# Patient Record
Sex: FEMALE | Race: WHITE
[De-identification: names, ages, dates, MRNs, and addresses within clinical notes are randomized per-mention and may not be internally consistent; named-entity substitution may affect disease eponyms.]

---

## 2019-11-06 ENCOUNTER — HOSPITAL ENCOUNTER (EMERGENCY)
Dept: HOSPITAL 35 - ER | Age: 64
Discharge: HOME | End: 2019-11-06
Payer: COMMERCIAL

## 2019-11-06 VITALS — BODY MASS INDEX: 28.17 KG/M2 | WEIGHT: 164.99 LBS | HEIGHT: 64 IN

## 2019-11-06 VITALS — SYSTOLIC BLOOD PRESSURE: 138 MMHG | DIASTOLIC BLOOD PRESSURE: 71 MMHG

## 2019-11-06 DIAGNOSIS — F17.210: ICD-10-CM

## 2019-11-06 DIAGNOSIS — M19.90: ICD-10-CM

## 2019-11-06 DIAGNOSIS — F31.9: ICD-10-CM
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
## 2019-11-06 DIAGNOSIS — Z91.013: ICD-10-CM
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
## 2019-11-06 DIAGNOSIS — S20.219A: ICD-10-CM

## 2019-11-06 DIAGNOSIS — Z86.73: ICD-10-CM

## 2019-11-06 DIAGNOSIS — S16.1XXA: Primary | ICD-10-CM

## 2019-11-06 DIAGNOSIS — Z88.6: ICD-10-CM
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
## 2019-11-06 LAB
ALBUMIN SERPL-MCNC: 3.5 G/DL (ref 3.4–5)
ALT SERPL-CCNC: 19 U/L (ref 30–65)
ANION GAP SERPL CALC-SCNC: 8 MMOL/L (ref 7–16)
AST SERPL-CCNC: 16 U/L (ref 15–37)
BASOPHILS NFR BLD AUTO: 0.7 % (ref 0–2)
BILIRUB SERPL-MCNC: 0.4 MG/DL
BUN SERPL-MCNC: 14 MG/DL (ref 7–18)
CALCIUM SERPL-MCNC: 9.6 MG/DL (ref 8.5–10.1)
CHLORIDE SERPL-SCNC: 102 MMOL/L (ref 98–107)
CO2 SERPL-SCNC: 29 MMOL/L (ref 21–32)
CREAT SERPL-MCNC: 1.2 MG/DL (ref 0.6–1)
EOSINOPHIL NFR BLD: 2.1 % (ref 0–3)
ERYTHROCYTE [DISTWIDTH] IN BLOOD BY AUTOMATED COUNT: 15.8 % (ref 10.5–14.5)
GLUCOSE SERPL-MCNC: 101 MG/DL (ref 74–106)
GRANULOCYTES NFR BLD MANUAL: 70.6 % (ref 36–66)
HCT VFR BLD CALC: 36.7 % (ref 37–47)
HGB BLD-MCNC: 12.2 GM/DL (ref 12–15)
LYMPHOCYTES NFR BLD AUTO: 20 % (ref 24–44)
MCH RBC QN AUTO: 29.4 PG (ref 26–34)
MCHC RBC AUTO-ENTMCNC: 33.1 G/DL (ref 28–37)
MCV RBC: 88.8 FL (ref 80–100)
MONOCYTES NFR BLD: 6.6 % (ref 1–8)
NEUTROPHILS # BLD: 8.3 THOU/UL (ref 1.4–8.2)
PLATELET # BLD: 336 THOU/UL (ref 150–400)
POTASSIUM SERPL-SCNC: 3.5 MMOL/L (ref 3.5–5.1)
PROT SERPL-MCNC: 7.2 G/DL (ref 6.4–8.2)
RBC # BLD AUTO: 4.13 MIL/UL (ref 4.2–5)
SODIUM SERPL-SCNC: 139 MMOL/L (ref 136–145)
TROPONIN I SERPL-MCNC: <0.06 NG/ML (ref ?–0.06)
WBC # BLD AUTO: 11.8 THOU/UL (ref 4–11)

## 2019-11-08 NOTE — EKG
[address]  [address]
Phone:  [phone]                    ELECTROCARDIOGRAM REPORT      
_______________________________________________________________________________
 
Name:       HARMAN LUCERO              [facility]                     [id number]     Account #:      [id number]  
Admission:  19    Attend Phys:                          
Discharge:  19    Date of Birth:  [birth year]  
                                                          Report #: [id number]
   [id number]
_______________________________________________________________________________
THIS REPORT FOR:   //name//                          
 
                         [facility] ED
                                       
Test Date:    2019               Test Time:    15:43:59
Pat Name:     HARMAN LUCERO              Department:   
Patient ID:   [id number]            Room:          
Gender:       F                        Technician:   AME
:          1955               Requested By: Lux Bronson
Order Number: [id number] MD:   Ye Jara
                                 Measurements
Intervals                              Axis          
Rate:         83                       P:            50
NJ:           154                      QRS:          48
QRSD:         96                       T:            33
QT:           402                                    
QTc:          473                                    
                           Interpretive Statements
Sinus rhythm
Compared to ECG 06/10/2012 22:17:44
T-wave abnormality no longer present
 
Electronically Signed On 2019 12:14:44 CST by Ye Jara
[email or web address]
 
 
 
 
 
 
 
 
 
 
 
 
 
 
 
 
 
 
 
  <ELECTRONICALLY SIGNED>
   By: Ye Jara MD               
  19     1214
D: 19 1543                           _____________________________________
T: 19 1543                           Ye Jara MD                 /CARROL